# Patient Record
Sex: MALE | URBAN - METROPOLITAN AREA
[De-identification: names, ages, dates, MRNs, and addresses within clinical notes are randomized per-mention and may not be internally consistent; named-entity substitution may affect disease eponyms.]

---

## 2022-08-26 ENCOUNTER — OFFICE VISIT (OUTPATIENT)
Dept: URBAN - METROPOLITAN AREA CLINIC 6 | Facility: CLINIC | Age: 27
End: 2022-08-26
Payer: MEDICAID

## 2022-08-26 DIAGNOSIS — H57.12 OCULAR PAIN, LEFT EYE: Primary | ICD-10-CM

## 2022-08-26 PROCEDURE — 92002 INTRM OPH EXAM NEW PATIENT: CPT | Performed by: OPTOMETRIST

## 2022-08-26 RX ORDER — METHYLPREDNISOLONE 4 MG/1
4 MG TABLET ORAL
Qty: 21 | Refills: 0 | Status: ACTIVE
Start: 2022-08-26

## 2022-08-26 RX ORDER — PREDNISOLONE ACETATE 10 MG/ML
1 % SUSPENSION/ DROPS OPHTHALMIC
Qty: 10 | Refills: 1 | Status: ACTIVE
Start: 2022-08-26

## 2022-08-26 RX ORDER — ACYCLOVIR 800 MG/1
800 MG TABLET ORAL
Qty: 20 | Refills: 0 | Status: INACTIVE
Start: 2022-08-26 | End: 2022-09-04

## 2022-08-26 ASSESSMENT — VISUAL ACUITY: OS: 20/60

## 2022-08-26 ASSESSMENT — INTRAOCULAR PRESSURE
OS: 20
OD: 18

## 2022-08-26 NOTE — IMPRESSION/PLAN
Impression: Ocular pain, left eye: H57.12. Plan: Pt states the pain is 10/10 and has been occurring for many years ever since he had an RD at age 5years old. Pt has never had blood work performed nor been provided an answer for such. Provided blood work panel to patient for GCA panel and Iritis Panel. Pt was Rx Oxycodone at the ER today. Pt states seeking care at the ER for pain in the past and is always prescribed oral pain medications with no answers to the cause of the described eye pain. No diplopia. Pt states the pain is so severe it wakes patient up from his sleep. Start Acyclovir 800 mg PO BID x 10 days, Medrol dose Jamey as directed with no refill, and Prednisolone QID OS with taper instructions provided (Carter minaya). Upon checking for refractive error OS has significant uncorrected myopia/astigmatism. Wet refraction OS showed: SPH: -1.25 CYL: -3.25 Axis: 175 Dva: 20/60 Potential differentials: Retrobulbar optic neuritis: no APD but reduced vision OS due to ocular trauma as a child and subsequent cataract surgery. Considering retrobulbar to rule out due to normal appearance of ONH appearance. Consider MRI of brain and orbits, CBC, RPR, FTA- ABS, Lyme Titer, ESR, ACE level and chest X-ray. Low on differentials is ocular ischemic syndrome due to having no abnormal vasculature, no mid peripheral hemes, no posterior segment neovascularization.

## 2022-11-28 ENCOUNTER — OFFICE VISIT (OUTPATIENT)
Dept: URBAN - METROPOLITAN AREA CLINIC 89 | Facility: CLINIC | Age: 27
End: 2022-11-28
Payer: MEDICAID

## 2022-11-28 DIAGNOSIS — Z96.1 PRESENCE OF INTRAOCULAR LENS: Primary | ICD-10-CM

## 2022-11-28 DIAGNOSIS — H52.223 REGULAR ASTIGMATISM, BILATERAL: ICD-10-CM

## 2022-11-28 DIAGNOSIS — H57.12 OCULAR PAIN OF LEFT EYE: ICD-10-CM

## 2022-11-28 PROCEDURE — 99213 OFFICE O/P EST LOW 20 MIN: CPT | Performed by: OPTOMETRIST

## 2022-11-28 ASSESSMENT — INTRAOCULAR PRESSURE
OD: 17
OS: 17

## 2022-11-28 NOTE — IMPRESSION/PLAN
Impression: Presence of intraocular lens: Z96.1 Left. Plan: Patient has a posterior chamber intraocular lens implant present since childhood. The pupil is permanently middilated and irregular. Topography was performed today and is consistent with with-the-rule astigmatism; no signs of corneal pathology noted. Otherwise the health of the eye looks unremarkable at this time.

## 2022-11-28 NOTE — IMPRESSION/PLAN
Impression: Ocular pain of left eye: H57.12. Plan: Patient was seen in August 2022 with complaints of left eye pain since childhood. He was prescribed topical steroids at that time and today he reports that he has not had any pain since.